# Patient Record
Sex: FEMALE | Race: WHITE | NOT HISPANIC OR LATINO | Employment: FULL TIME | URBAN - METROPOLITAN AREA
[De-identification: names, ages, dates, MRNs, and addresses within clinical notes are randomized per-mention and may not be internally consistent; named-entity substitution may affect disease eponyms.]

---

## 2018-12-21 ENCOUNTER — ANESTHESIA - HEALTHEAST (OUTPATIENT)
Dept: SURGERY | Facility: AMBULATORY SURGERY CENTER | Age: 48
End: 2018-12-21

## 2018-12-21 RX ORDER — IBUPROFEN 200 MG
200-800 TABLET ORAL EVERY 6 HOURS PRN
Status: SHIPPED | COMMUNITY
Start: 2018-12-21

## 2018-12-21 ASSESSMENT — MIFFLIN-ST. JEOR
SCORE: 1604.26
SCORE: 1604.26

## 2018-12-26 ENCOUNTER — SURGERY - HEALTHEAST (OUTPATIENT)
Dept: SURGERY | Facility: AMBULATORY SURGERY CENTER | Age: 48
End: 2018-12-26

## 2018-12-26 ENCOUNTER — HOSPITAL ENCOUNTER (OUTPATIENT)
Dept: SURGERY | Facility: AMBULATORY SURGERY CENTER | Age: 48
Discharge: HOME | End: 2018-12-26
Attending: OBSTETRICS & GYNECOLOGY | Admitting: OBSTETRICS & GYNECOLOGY

## 2018-12-26 DIAGNOSIS — N92.1 MENOMETRORRHAGIA: ICD-10-CM

## 2018-12-26 LAB
DIPSTICK EXPIRATION DATE - HISTORICAL: NORMAL
DIPSTICK LOT NUMBER - HISTORICAL: NORMAL
POC PREG URINE (HCG) HE - HISTORICAL: NEGATIVE
POC SPECIFIC GRAVITY, URINE - HISTORICAL: NORMAL
POCT KIT EXPIRATION DATE - HISTORICAL: NORMAL
POCT KIT LOT NUMBER HE - HISTORICAL: NORMAL
POCT NEGATIVE CONTROL HE - HISTORICAL: NORMAL
POCT POSITIVE CONTROL HE - HISTORICAL: NORMAL

## 2018-12-26 ASSESSMENT — MIFFLIN-ST. JEOR
SCORE: 1604.26
SCORE: 1604.26

## 2020-01-14 ENCOUNTER — OFFICE VISIT - HEALTHEAST (OUTPATIENT)
Dept: SURGERY | Facility: CLINIC | Age: 50
End: 2020-01-14

## 2020-01-14 DIAGNOSIS — K80.20 CALCULUS OF GALLBLADDER WITHOUT CHOLECYSTITIS WITHOUT OBSTRUCTION: ICD-10-CM

## 2020-01-14 ASSESSMENT — MIFFLIN-ST. JEOR: SCORE: 1615.59

## 2020-01-16 ENCOUNTER — ANESTHESIA - HEALTHEAST (OUTPATIENT)
Dept: SURGERY | Facility: AMBULATORY SURGERY CENTER | Age: 50
End: 2020-01-16

## 2020-01-16 ASSESSMENT — MIFFLIN-ST. JEOR: SCORE: 1615.59

## 2020-01-17 ENCOUNTER — SURGERY - HEALTHEAST (OUTPATIENT)
Dept: SURGERY | Facility: AMBULATORY SURGERY CENTER | Age: 50
End: 2020-01-17

## 2020-01-23 ENCOUNTER — OFFICE VISIT - HEALTHEAST (OUTPATIENT)
Dept: SURGERY | Facility: CLINIC | Age: 50
End: 2020-01-23

## 2020-01-23 DIAGNOSIS — Z48.89 POSTOPERATIVE VISIT: ICD-10-CM

## 2021-05-26 VITALS — TEMPERATURE: 98.1 F

## 2021-06-02 VITALS
BODY MASS INDEX: 36.49 KG/M2 | HEIGHT: 65 IN | HEIGHT: 65 IN | BODY MASS INDEX: 36.49 KG/M2 | WEIGHT: 219 LBS | WEIGHT: 219 LBS

## 2021-06-04 VITALS
BODY MASS INDEX: 35.03 KG/M2 | SYSTOLIC BLOOD PRESSURE: 110 MMHG | HEIGHT: 66 IN | OXYGEN SATURATION: 98 % | DIASTOLIC BLOOD PRESSURE: 78 MMHG | HEART RATE: 81 BPM | WEIGHT: 218 LBS

## 2021-06-04 VITALS — WEIGHT: 218 LBS | HEIGHT: 66 IN | BODY MASS INDEX: 35.03 KG/M2

## 2021-06-05 NOTE — PROGRESS NOTES
General Surgery Consultation    Consulting Provider: ED Provider, Dr. Salvador    CC: gallstones and RUQ pain    History:   Shania Block is a 49 y.o. female referred to see me for gallstones and RUQ pain. Pt presented to the ED on 1/11 with complaints of acute right upper quadrant pain and nausea. Her labs were normal and US showed a large calcified gallstone but no evidence of cholecystitis so patient was discharged and scheduled for followup today.    She states the pain has now resolved but she occasionally feels a dull ache in her right upper quadrant. She has had recurrent and intermittent RUQ pain 6-7 times over the last 6 months and it is becoming more frequent. She denies any fever, chills, vomiting.     Allergies:  Tramadol    Past medical history:  Past Medical History:   Diagnosis Date     Arthritis        Past surgical history:  Past Surgical History:   Procedure Laterality Date     VT HYSTEROSCOPY,W/ENDOMETRIAL ABLATION N/A 12/26/2018    Procedure: HYSTEROSCOPY, D&C, NOVASURE ENDOMETRIAL ABLATION;  Surgeon: Magda Cai MD;  Location: [facility];  Service: Gynecology     TUBAL LIGATION         Medications:    Current Outpatient Medications:      HYDROcodone-acetaminophen (NORCO )  mg per tablet, Take 1 tablet by mouth every 8 (eight) hours as needed for pain., Disp: 10 tablet, Rfl: 0     ibuprofen (ADVIL,MOTRIN) 200 MG tablet, Take 200 mg by mouth every 6 (six) hours as needed for pain., Disp: , Rfl:     Family history:  Pt denies family history of gallstones or gallbladder disease    Social history:   reports that she has quit smoking. She has never used smokeless tobacco. She reports current alcohol use. She reports that she does not use drugs.    Review of Systems:  General: No complaints or constitutional symptoms  Skin: no rashes  Hematologic/Lymphatic: No symptoms or complaints  Psychiatric: No symptoms or complaints  Endocrine: No excessive fatigue, no  "hypermetabolic symptoms reported  Respiratory: No cough, shortness of breath, or wheezing  Cardiovascular: No chest pain or dyspnea on exertion  Gastrointestinal: see HPI  Musculoskeletal: No recent injuries reported  Neurological: No focal neurologic defects reported  Breast: No discharge, skin changes, or palpable masses    Exam:  /78 (Patient Site: Right Arm, Patient Position: Sitting, Cuff Size: Adult Large)   Pulse 81   Ht 5' 6\" (1.676 m)   Wt 218 lb (98.9 kg)   SpO2 98%   Breastfeeding No   BMI 35.19 kg/m    Body mass index is 35.19 kg/m .  General : Alert, cooperative, appears stated age   Skin: Skin color, texture, turgor normal, no rashes or lesions   Lymphatic: No obvious adenopathy, no swelling   Eyes: No scleral icterus, pupils equal  HENT: No traumatic injury to the head or face, no gross abnormalities  Lungs: Normal respiratory effort, breath sounds equal bilaterally  Heart: Regular rate and rhythm  Abdomen: soft, nondistended, nontender to palpation throughout, no hernias  Musculoskeletal: No obvious swelling  Neurologic: Grossly intact    Labs:  Lab Results   Component Value Date    WBC 7.1 01/11/2020    HGB 13.8 01/11/2020    HCT 40.7 01/11/2020    MCV 86 01/11/2020     01/11/2020     Results from last 7 days   Lab Units 01/11/20  1433   LN-SODIUM mmol/L 139   LN-POTASSIUM mmol/L 3.9   LN-CHLORIDE mmol/L 105   LN-CO2 mmol/L 24   LN-BLOOD UREA NITROGEN mg/dL 8   LN-CREATININE mg/dL 0.71   LN-CALCIUM mg/dL 8.7     Lab Results   Component Value Date    ALT 76 (H) 01/11/2020    AST 80 (H) 01/11/2020    ALKPHOS 75 01/11/2020    BILITOT 0.5 01/11/2020       Imaging:   Pertinent images personally reviewed by myself and discussed with the patient.  Radiology reports:  US Abd-1.  Large calcified gallstone. Negative sonographic Darnell sign. No wall thickening.    Assessment/Plan:   Shania Block is a 49 y.o. female with signs and symptoms consistent with biliary colic.  I have " explained the pathophysiology of gallstones and gallbladder disease in detail as well as the surgical versus non-operative management strategies.      The risks of surgery were discussed in detail which include, but are not limited to, bleeding, infection, injury to surrounding structures including the common bile duct and hepatic vessels, the need to convert to an open procedure, blood clots, stroke, heart attack and death.  Additionally, the risks of non operative management were discussed which include, but are not limited to recurrent pain and infection of gallbladder or bile ducts.    She understands everything which was discussed and has consented to proceed with a laparoscopic cholecystectomy. Surgery scheduled for 1/17/20.    Luciana Whiting MD  General Surgery  [facility]  [phone]

## 2021-06-05 NOTE — ANESTHESIA CARE TRANSFER NOTE
Last vitals:   Vitals:    01/17/20 1106   BP: (!) 181/79   Pulse: 67   Resp: 14   Temp: 36.2  C (97.2  F)   SpO2: (P) 100%     Patient spontaneous RR, TV 300s, suctioned, following commands, extubated to facemask 10LPM, O2 sats 100%. VSS. Report to RN.    Patient's level of consciousness is drowsy  Spontaneous respirations: yes  Maintains airway independently: yes  Dentition unchanged: yes  Oropharynx: oropharynx clear of all foreign objects    QCDR Measures:  ASA# 20 - Surgical Safety Checklist: WHO surgical safety checklist completed prior to induction    PQRS# 430 - Adult PONV Prevention: 4558F - Pt received => 2 anti-emetic agents (different classes) preop & intraop  ASA# 8 - Peds PONV Prevention: NA - Not pediatric patient, not GA or 2 or more risk factors NOT present  PQRS# 424 - Sandra-op Temp Management: 4559F - At least one body temp DOCUMENTED => 35.5C or 95.9F within required timeframe  PQRS# 426 - PACU Transfer Protocol: - Transfer of care checklist used  ASA# 14 - Acute Post-op Pain: ASA14B - Patient did NOT experience pain >= 7 out of 10

## 2021-06-05 NOTE — PROGRESS NOTES
Gallbladder education & surgery packet provided to pt.    Chiqui Stiles CMA ([facility])

## 2021-06-05 NOTE — PROGRESS NOTES
"HPI: Pt is here for follow up laparoscopic cholecystectomy with Dr. Whiting on 1/17/2020.   she is doing well but is worried about her epigastric incision site. She reports some discharge a couple days ago that was \"yellow and smelly\". This was following her daily administration of hydrogen peroxide to the incisions. Pain is well controlled.  she is eating well and denies fever and chills.         Temp 98.1  F (36.7  C)     EXAM:  GENERAL:Appears well  ABDOMEN:  Soft, +BS  SURGICAL WOUNDS:  Incisions healing well but there 0.25 cm separation with slough at the epigastric site, no erythema, tenderness or induration; no expression of drainage with palpation/pressure      Assessment/Plan: No signs of infection at wound sites, but there is some minor separation of the incision and slough within the incision. This could be caused by tissue damage from the repeated use of hydrogen peroxide. She should stop using the hydrogen peroxide and clean incisions with soap and water. She was instructed about signs/symptoms of infection and was told to follow up as needed.    Elisha Shukla , [facility]       "

## 2021-06-05 NOTE — ANESTHESIA POSTPROCEDURE EVALUATION
Patient: Shania Block  CHOLECYSTECTOMY, LAPAROSCOPIC  Anesthesia type: general    Patient location: Phase II Recovery  Last vitals:   Vitals Value Taken Time   /75 1/17/2020  1:00 PM   Temp 36.2  C (97.2  F) 1/17/2020 11:06 AM   Pulse 66 1/17/2020  1:00 PM   Resp 16 1/17/2020  1:00 PM   SpO2 96 % 1/17/2020  1:00 PM     Post vital signs: stable  Level of consciousness: awake and responds to simple questions  Post-anesthesia pain: pain controlled  Post-anesthesia nausea and vomiting: no  Pulmonary: unassisted, return to baseline  Cardiovascular: stable and blood pressure at baseline  Hydration: adequate  Anesthetic events: no    QCDR Measures:  ASA# 11 - Sandra-op Cardiac Arrest: ASA11B - Patient did NOT experience unanticipated cardiac arrest  ASA# 12 - Sandra-op Mortality Rate: ASA12B - Patient did NOT die  ASA# 13 - PACU Re-Intubation Rate: ASA13B - Patient did NOT require a new airway mgmt  ASA# 10 - Composite Anes Safety: ASA10A - No serious adverse event    Additional Notes:

## 2021-06-05 NOTE — ANESTHESIA PREPROCEDURE EVALUATION
Anesthesia Evaluation      Patient summary reviewed   No history of anesthetic complications     Airway   Mallampati: II   Pulmonary - negative ROS and normal exam                          Cardiovascular - negative ROS and normal exam  Rhythm: regular  Rate: normal,         Neuro/Psych - negative ROS     Endo/Other - negative ROS      GI/Hepatic/Renal - negative ROS      Other findings: Results for EDDA SIMPSON (MRN [id number]) as of 1/17/2020 09:09    1/11/2020 14:33  Sodium: 139  Potassium: 3.9  Chloride: 105  CO2: 24  Anion Gap, Calculation: 10  BUN: 8  Creatinine: 0.71  GFR MDRD Af Amer: >60  GFR MDRD Non Af Amer: >60  Results for EDDA SIMPSON (MRN [id number]) as of 1/17/2020 09:09    1/11/2020 14:33  WBC: 7.1  RBC: 4.72  Hemoglobin: 13.8  Hematocrit: 40.7  MCV: 86  MCH: 29.2  MCHC: 33.9  RDW: 12.5  Platelets: 285        Dental - normal exam                        Anesthesia Plan  Planned anesthetic: general endotracheal  GAETT  Scopolamine for PONV  Antiemetics  Tylenol po pre op  Toradol at the end of case if okay with surgeon  Consider background propofol  Soft bite block  ASA 1   Induction: intravenous   Anesthetic plan and risks discussed with: patient    Post-op plan: routine recovery

## 2021-06-16 PROBLEM — K80.10 CHRONIC CHOLECYSTITIS WITH CALCULUS: Status: ACTIVE | Noted: 2020-01-14

## 2021-06-22 NOTE — ANESTHESIA CARE TRANSFER NOTE
Last vitals:   Vitals:    12/26/18 0850   BP: (P) 120/58   Pulse:    Resp: (P) 16   Temp: (P) 36.8  C (98.3  F)   SpO2: (P) 97%     Pt brought to phase 2 on 6L facemask. Monitors applied. VSS upon arrival.    Patient's level of consciousness is drowsy  Spontaneous respirations: yes  Maintains airway independently: yes  Dentition unchanged: yes  Oropharynx: oropharynx clear of all foreign objects    QCDR Measures:  ASA# 20 - Surgical Safety Checklist: WHO surgical safety checklist completed prior to induction    PQRS# 430 - Adult PONV Prevention: 4558F - Pt received => 2 anti-emetic agents (different classes) preop & intraop  ASA# 8 - Peds PONV Prevention: NA - Not pediatric patient, not GA or 2 or more risk factors NOT present  PQRS# 424 - Sandra-op Temp Management: 4559F - At least one body temp DOCUMENTED => 35.5C or 95.9F within required timeframe  PQRS# 426 - PACU Transfer Protocol: - Transfer of care checklist used  ASA# 14 - Acute Post-op Pain: ASA14B - Patient did NOT experience pain >= 7 out of 10

## 2021-06-22 NOTE — INTERVAL H&P NOTE
I have performed an assessment and examined the patient, as necessary, to update the patient's current status that may have changed since the prior History and Physical.  The History & Physical has been reviewed and the patient's status is unchanged.     Magda Cai MD FACOG  [facility]  [phone]

## 2021-06-22 NOTE — ANESTHESIA POSTPROCEDURE EVALUATION
Patient: Shania RODRIGUEZ Umair  HYSTEROSCOPY, D&C, NOVASURE ENDOMETRIAL ABLATION  Anesthesia type: MAC    Patient location: Phase II Recovery  Last vitals:   Vitals:    12/26/18 0850   BP: 120/58   Pulse:    Resp: 16   Temp: 36.8  C (98.3  F)   SpO2: 97%     Post vital signs: stable  Level of consciousness: awake and responds to simple questions  Post-anesthesia pain: pain controlled  Post-anesthesia nausea and vomiting: no  Pulmonary: unassisted, return to baseline  Cardiovascular: stable and blood pressure at baseline  Hydration: adequate  Anesthetic events: no    QCDR Measures:  ASA# 11 - Sandra-op Cardiac Arrest: ASA11B - Patient did NOT experience unanticipated cardiac arrest  ASA# 12 - Sandra-op Mortality Rate: ASA12B - Patient did NOT die  ASA# 13 - PACU Re-Intubation Rate: ASA13B - Patient did NOT require a new airway mgmt  ASA# 10 - Composite Anes Safety: ASA10A - No serious adverse event    Additional Notes:

## 2021-06-22 NOTE — ANESTHESIA PREPROCEDURE EVALUATION
Anesthesia Evaluation      Patient summary reviewed   No history of anesthetic complications     Airway   Mallampati: II  Neck ROM: full   Pulmonary - normal exam   (+) a smoker (remote)                         Cardiovascular - negative ROS and normal exam   Neuro/Psych - negative ROS     Endo/Other    (+) obesity (bmi 36),      GI/Hepatic/Renal - negative ROS      Other findings: H/o Lyme disease      Dental - normal exam   (+) caps                       Anesthesia Plan  Planned anesthetic: MAC    ASA 2   Induction: intravenous   Anesthetic plan and risks discussed with: patient and significant other  Anesthesia plan special considerations: antiemetics,   Post-op plan: routine recovery      UPT negative

## 2021-06-22 NOTE — OP NOTE
PROCEDURE NOTE - HYSTEROSCOPY AND DILATION AND CURETTAGE, NOVASURE ABLATION    Name: Shania Block   : 1970   MRN: [id number]     DATE OF SERVICE:  2018     PREOPERATIVE DIAGNOSIS: thickened endometrium and menorrhagi    POSTOPERATIVE DIAGNOSIS: same  PROCEDURES: Hysteroscopy, dilatation and curettage, novasure    SURGEON: Magda Cai     ANESTHESIA:  mac    ESTIMATED BLOOD LOSS:   5 mL    HISTORY OF PRESENT ILLNESS:  This is a 48 y.o. female with history of worsening menorrhagia. She had a transvaginal ultrasound prior to which showed a thickened endometrium but no discrete pathology. Endometrial biopsy was benign tissue.  She desired minimally invasive treatment.  She was given informed consent for the above procedure. The risks, benefits and alternatives were discussed with her including but not exclusive to uterine perforation, bleeding and infection, failure and need for further procedures. She expressed understanding and wished to proceed.     PROCEDURE:  The patient was brought to the operating room and after induction of MAC anesthesia was prepped and draped in the dorsal lithotomy position. A time out was called and the patient and the procedure were verified.      A sterile weighted speculum was then placed. The anterior lip of the cervix was grasped with a single tooth tenaculum. Uterine cavity was then sounded to 7 cm. The cervix was gently dilated using Margaret dilators and the hysteroscope was introduced without difficulty. The cavity of the uterus appeared to have marked proliferative tissue. The hysteroscope was removed. At this point endometrial curettings were obtained by curettage. All quadrants were sampled, and the tissue was submitted for pathologic exam.  Novasure was performed with a cavity length of 4.0 cm, width of 3.7 cm and cycle time of 1 minute 48 seconds. The hysteroscope was reinserted and the uterine cavity was visualized again.  At this point ablated  tissue was confirmed. Instruments were removed from vagina. No active bleeding was noted. Sponge and needle counts were correct X 2. She was taken to recovery in stable condition.    Magda Cai MD

## 2022-06-24 ENCOUNTER — APPOINTMENT (OUTPATIENT)
Dept: CT IMAGING | Facility: HOSPITAL | Age: 52
End: 2022-06-24
Attending: EMERGENCY MEDICINE
Payer: COMMERCIAL

## 2022-06-24 ENCOUNTER — HOSPITAL ENCOUNTER (EMERGENCY)
Facility: HOSPITAL | Age: 52
Discharge: ANOTHER HOSPITAL | End: 2022-06-24
Attending: EMERGENCY MEDICINE | Admitting: EMERGENCY MEDICINE
Payer: COMMERCIAL

## 2022-06-24 VITALS
HEIGHT: 66 IN | DIASTOLIC BLOOD PRESSURE: 62 MMHG | SYSTOLIC BLOOD PRESSURE: 134 MMHG | TEMPERATURE: 96 F | HEART RATE: 110 BPM | RESPIRATION RATE: 16 BRPM | BODY MASS INDEX: 33.75 KG/M2 | OXYGEN SATURATION: 94 % | WEIGHT: 210 LBS

## 2022-06-24 DIAGNOSIS — R10.84 ABDOMINAL PAIN, GENERALIZED: ICD-10-CM

## 2022-06-24 LAB
ALBUMIN SERPL-MCNC: 3.7 G/DL (ref 3.5–5)
ALBUMIN UR-MCNC: 20 MG/DL
ALP SERPL-CCNC: 102 U/L (ref 45–120)
ALT SERPL W P-5'-P-CCNC: 265 U/L (ref 0–45)
ANION GAP SERPL CALCULATED.3IONS-SCNC: 13 MMOL/L (ref 5–18)
APPEARANCE UR: CLEAR
AST SERPL W P-5'-P-CCNC: 216 U/L (ref 0–40)
BACTERIA #/AREA URNS HPF: ABNORMAL /HPF
BASOPHILS # BLD AUTO: 0.1 10E3/UL (ref 0–0.2)
BASOPHILS NFR BLD AUTO: 1 %
BILIRUB SERPL-MCNC: 1.2 MG/DL (ref 0–1)
BILIRUB UR QL STRIP: NEGATIVE
BUN SERPL-MCNC: 15 MG/DL (ref 8–22)
CALCIUM SERPL-MCNC: 9 MG/DL (ref 8.5–10.5)
CHLORIDE BLD-SCNC: 103 MMOL/L (ref 98–107)
CO2 SERPL-SCNC: 21 MMOL/L (ref 22–31)
COLOR UR AUTO: YELLOW
CREAT SERPL-MCNC: 1 MG/DL (ref 0.6–1.1)
EOSINOPHIL # BLD AUTO: 0 10E3/UL (ref 0–0.7)
EOSINOPHIL NFR BLD AUTO: 0 %
ERYTHROCYTE [DISTWIDTH] IN BLOOD BY AUTOMATED COUNT: 12.8 % (ref 10–15)
GFR SERPL CREATININE-BSD FRML MDRD: 67 ML/MIN/1.73M2
GLUCOSE BLD-MCNC: 160 MG/DL (ref 70–125)
GLUCOSE UR STRIP-MCNC: NEGATIVE MG/DL
HCT VFR BLD AUTO: 53 % (ref 35–47)
HGB BLD-MCNC: 17.6 G/DL (ref 11.7–15.7)
HGB UR QL STRIP: NEGATIVE
IMM GRANULOCYTES # BLD: 0.1 10E3/UL
IMM GRANULOCYTES NFR BLD: 0 %
KETONES UR STRIP-MCNC: ABNORMAL MG/DL
LEUKOCYTE ESTERASE UR QL STRIP: NEGATIVE
LIPASE SERPL-CCNC: 41 U/L (ref 0–52)
LYMPHOCYTES # BLD AUTO: 0.6 10E3/UL (ref 0.8–5.3)
LYMPHOCYTES NFR BLD AUTO: 4 %
MCH RBC QN AUTO: 29 PG (ref 26.5–33)
MCHC RBC AUTO-ENTMCNC: 33.2 G/DL (ref 31.5–36.5)
MCV RBC AUTO: 87 FL (ref 78–100)
MONOCYTES # BLD AUTO: 0.9 10E3/UL (ref 0–1.3)
MONOCYTES NFR BLD AUTO: 6 %
MUCOUS THREADS #/AREA URNS LPF: PRESENT /LPF
NEUTROPHILS # BLD AUTO: 13.7 10E3/UL (ref 1.6–8.3)
NEUTROPHILS NFR BLD AUTO: 89 %
NITRATE UR QL: NEGATIVE
NRBC # BLD AUTO: 0 10E3/UL
NRBC BLD AUTO-RTO: 0 /100
PH UR STRIP: 5.5 [PH] (ref 5–7)
PLATELET # BLD AUTO: 465 10E3/UL (ref 150–450)
POTASSIUM BLD-SCNC: 4.2 MMOL/L (ref 3.5–5)
PROT SERPL-MCNC: 7.9 G/DL (ref 6–8)
RBC # BLD AUTO: 6.07 10E6/UL (ref 3.8–5.2)
RBC URINE: 1 /HPF
SARS-COV-2 RNA RESP QL NAA+PROBE: NEGATIVE
SODIUM SERPL-SCNC: 137 MMOL/L (ref 136–145)
SP GR UR STRIP: >1.05 (ref 1–1.03)
SQUAMOUS EPITHELIAL: 2 /HPF
UROBILINOGEN UR STRIP-MCNC: <2 MG/DL
WBC # BLD AUTO: 15.3 10E3/UL (ref 4–11)
WBC URINE: 2 /HPF

## 2022-06-24 PROCEDURE — C9803 HOPD COVID-19 SPEC COLLECT: HCPCS

## 2022-06-24 PROCEDURE — 85004 AUTOMATED DIFF WBC COUNT: CPT | Performed by: EMERGENCY MEDICINE

## 2022-06-24 PROCEDURE — 96374 THER/PROPH/DIAG INJ IV PUSH: CPT | Mod: 59

## 2022-06-24 PROCEDURE — 36415 COLL VENOUS BLD VENIPUNCTURE: CPT | Performed by: EMERGENCY MEDICINE

## 2022-06-24 PROCEDURE — 81001 URINALYSIS AUTO W/SCOPE: CPT | Performed by: EMERGENCY MEDICINE

## 2022-06-24 PROCEDURE — 96375 TX/PRO/DX INJ NEW DRUG ADDON: CPT

## 2022-06-24 PROCEDURE — 250N000011 HC RX IP 250 OP 636: Performed by: EMERGENCY MEDICINE

## 2022-06-24 PROCEDURE — 96361 HYDRATE IV INFUSION ADD-ON: CPT

## 2022-06-24 PROCEDURE — 96376 TX/PRO/DX INJ SAME DRUG ADON: CPT

## 2022-06-24 PROCEDURE — 80053 COMPREHEN METABOLIC PANEL: CPT | Performed by: EMERGENCY MEDICINE

## 2022-06-24 PROCEDURE — 87635 SARS-COV-2 COVID-19 AMP PRB: CPT | Performed by: EMERGENCY MEDICINE

## 2022-06-24 PROCEDURE — 258N000003 HC RX IP 258 OP 636: Performed by: EMERGENCY MEDICINE

## 2022-06-24 PROCEDURE — 83690 ASSAY OF LIPASE: CPT | Performed by: EMERGENCY MEDICINE

## 2022-06-24 PROCEDURE — 99285 EMERGENCY DEPT VISIT HI MDM: CPT | Mod: 25

## 2022-06-24 PROCEDURE — 74177 CT ABD & PELVIS W/CONTRAST: CPT

## 2022-06-24 RX ORDER — ONDANSETRON 2 MG/ML
4 INJECTION INTRAMUSCULAR; INTRAVENOUS ONCE
Status: COMPLETED | OUTPATIENT
Start: 2022-06-24 | End: 2022-06-24

## 2022-06-24 RX ORDER — SODIUM CHLORIDE 9 MG/ML
INJECTION, SOLUTION INTRAVENOUS CONTINUOUS
Status: DISCONTINUED | OUTPATIENT
Start: 2022-06-24 | End: 2022-06-24 | Stop reason: HOSPADM

## 2022-06-24 RX ORDER — MORPHINE SULFATE 4 MG/ML
4 INJECTION, SOLUTION INTRAMUSCULAR; INTRAVENOUS ONCE
Status: COMPLETED | OUTPATIENT
Start: 2022-06-24 | End: 2022-06-24

## 2022-06-24 RX ORDER — IOPAMIDOL 755 MG/ML
75 INJECTION, SOLUTION INTRAVASCULAR ONCE
Status: COMPLETED | OUTPATIENT
Start: 2022-06-24 | End: 2022-06-24

## 2022-06-24 RX ADMIN — MORPHINE SULFATE 4 MG: 4 INJECTION INTRAVENOUS at 06:00

## 2022-06-24 RX ADMIN — MORPHINE SULFATE 4 MG: 4 INJECTION INTRAVENOUS at 07:46

## 2022-06-24 RX ADMIN — IOPAMIDOL 75 ML: 755 INJECTION, SOLUTION INTRAVENOUS at 08:58

## 2022-06-24 RX ADMIN — SODIUM CHLORIDE 1000 ML: 9 INJECTION, SOLUTION INTRAVENOUS at 05:59

## 2022-06-24 RX ADMIN — HYDROMORPHONE HYDROCHLORIDE 0.5 MG: 1 INJECTION, SOLUTION INTRAMUSCULAR; INTRAVENOUS; SUBCUTANEOUS at 13:41

## 2022-06-24 RX ADMIN — ONDANSETRON 4 MG: 2 INJECTION INTRAMUSCULAR; INTRAVENOUS at 07:47

## 2022-06-24 RX ADMIN — ONDANSETRON 4 MG: 2 INJECTION INTRAMUSCULAR; INTRAVENOUS at 13:50

## 2022-06-24 RX ADMIN — SODIUM CHLORIDE: 9 INJECTION, SOLUTION INTRAVENOUS at 13:41

## 2022-06-24 RX ADMIN — ONDANSETRON 4 MG: 2 INJECTION INTRAMUSCULAR; INTRAVENOUS at 05:59

## 2022-06-24 NOTE — ED NOTES
Called phlebotomist for hemolyzed green tube due to pt being hard stick and IV unable to draw blood off.

## 2022-06-24 NOTE — PHARMACY-ADMISSION MEDICATION HISTORY
Pharmacy Note - Admission Medication History    Pertinent Provider Information: may use upto 1200mg ibuprofen per dose, I did talk to her about a 800mg dose max.     ______________________________________________________________________    Prior To Admission (PTA) med list completed and updated in EMR.       PTA Med List   Medication Sig Note Last Dose     ibuprofen (ADVIL,MOTRIN) 200 MG tablet Take 200-800 mg by mouth every 6 hours as needed May also use advil arthritis- 400mg tab 6/24/2022: Does take upto 6 tabs(1200mg) per dose 6/22/2022       Information source(s): Patient, Clinic records and [facility]  Method of interview communication: in-person    Summary of Changes to PTA Med List  New: none  Discontinued: none  Changed: none    Patient was asked about OTC/herbal products specifically.  PTA med list reflects this.    In the past week, patient estimated taking medication this percent of the time:  greater than 90%.    Allergies were reviewed, assessed, and updated with the patient.      Patient does not use any multi-dose medications prior to admission.    The information provided in this note is only as accurate as the sources available at the time of the update(s).    Thank you for the opportunity to participate in the care of this patient.    Marcelo Alonzo RPH  6/24/2022 8:20 AM

## 2022-06-24 NOTE — ED PROVIDER NOTES
EMERGENCY DEPARTMENT ENCOUNTER      NAME: Shania Block  AGE: 52 year old female  YOB: 1970  MRN: [id number]  EVALUATION DATE & TIME: No admission date for patient encounter.    PCP: No primary care provider on file.    ED PROVIDER: Rula Beltrán M.D.      Chief Complaint   Patient presents with     Abdominal Pain         FINAL IMPRESSION:  1. Abdominal pain, generalized          ED COURSE & MEDICAL DECISION MAKING:    ED Course as of 06/24/22 0552   Fri Jun 24, 2022   0548 Patient with abdominal pain and distension with no flatus and with vomiting with history of remote cholecystectomy with high suspicion of SBO, given morphine/zofran and IVF and CT abdomen pending, patient and  amenable to plan in the ED.   0552 Patient signed out to daytime ED MD pending CT abdomen and clinical reassessment       5:43 AM I met with the patient, obtained history, performed an initial exam, and discussed options and plan for diagnostics and treatment here in the ED.    Pertinent Labs & Imaging studies reviewed. (See chart for details)    N95 worn  A face shield was worn also  COVID PPE      At the conclusion of the encounter I discussed the results of all of the tests and the disposition. The questions were answered. The patient or family acknowledged understanding and was agreeable with the care plan.     MEDICATIONS GIVEN IN THE EMERGENCY:  Medications   0.9% sodium chloride BOLUS (has no administration in time range)   morphine (PF) injection 4 mg (has no administration in time range)   ondansetron (ZOFRAN) injection 4 mg (has no administration in time range)       NEW PRESCRIPTIONS STARTED AT TODAY'S ER VISIT  New Prescriptions    No medications on file          =================================================================    HPI    Shania Block is a 52 year old female with PMHx of hyperlipidemia, s/p cholecystectomy, Lyme disease with arthritis, obesity, who presents to the ED today via  walk-in with abdominal pain and vomiting.     Patient reports constant epigastric pain that started yesterday at 1900. She states she initially thought that pain would resolve, but it progressively worsened. She describes pain as sharp, cramping, and stabbing. Patient notes that around 2200 last night, she started vomiting and has had multiple episodes throughout the night. At present, pain is greater than 10/10. She notes associating difficulties passing gas. Patient reports a previous cholecystectomy. Otherwise, she denies any fever, cough, and urinary symptoms including dysuria, hematuria, urgency, and frequency. She denies any history of bowel obstruction. Patient is not on blood thinners. No other complaints at this time.    REVIEW OF SYSTEMS   All other systems reviewed and are negative except as noted above in HPI.    PAST MEDICAL HISTORY:  Past Medical History:   Diagnosis Date     Arthritis        PAST SURGICAL HISTORY:  Past Surgical History:   Procedure Laterality Date     HYSTEROSCOPY, ABLATE ENDOMETRIUM HYDROTHERMAL, COMBINED N/A 12/26/2018    Procedure: HYSTEROSCOPY, D&C, NOVASURE ENDOMETRIAL ABLATION;  Surgeon: Magda Cai MD;  Location: [facility];  Service: Gynecology     TUBAL LIGATION       ZZC LAP,CHOLECYSTECTOMY/EXPLORE N/A 1/17/2020    Procedure: CHOLECYSTECTOMY, LAPAROSCOPIC;  Surgeon: Luciana Whiting MD;  Location: [facility];  Service: General       CURRENT MEDICATIONS:    HYDROcodone-acetaminophen 5-325 mg per tablet  ibuprofen (ADVIL,MOTRIN) 200 MG tablet        ALLERGIES:  Allergies   Allergen Reactions     Tramadol Itching       FAMILY HISTORY:  History reviewed. No pertinent family history.    SOCIAL HISTORY:   Social History     Socioeconomic History     Marital status:    Tobacco Use     Smoking status: Former Smoker     Smokeless tobacco: Never Used   Substance and Sexual Activity     Alcohol use: Yes     Drug use: No       VITALS:  Patient  "Vitals for the past 24 hrs:   BP Temp Temp src Pulse Resp SpO2 Height Weight   06/24/22 0584 117/89 (!) 96  F (35.6  C) Tympanic 105 16 96 % 1.676 m (5' 6\") 95.3 kg (210 lb)       PHYSICAL EXAM    GENERAL: Awake, alert.   HEENT: Normocephalic, atraumatic.  Pupils equal, round and reactive.  Conjunctiva normal.  EOMI.  NECK: No stridor or apparent deformity.  PULMONARY: Symmetrical breath sounds without distress.  Lungs clear to auscultation bilaterally without wheezes, rhonchi or rales.  CARDIO: Regular rate and rhythm.  No significant murmur, rub or gallop.  Radial pulses strong and symmetrical.  ABDOMINAL: Abdomen soft, distended without any focal tenderness.  No CVAT, no palpable hepatosplenomegaly.  EXTREMITIES: No lower extremity swelling or edema.    NEURO: Alert and oriented to person, place and time.  Cranial nerves grossly intact.  No focal motor deficit.  PSYCH: Normal mood and affect  SKIN: No rashes             RADIOLOGY:  Reviewed all pertinent imaging. Please see official radiology report.  CT Abdomen Pelvis w Contrast    (Results Pending)           I, Laura Red, am serving as a scribe to document services personally performed by Dr. Rula Beltrán based on my observation and the provider's statements to me. IRula MD attest that Laura Red is acting in a scribe capacity, has observed my performance of the services and has documented them in accordance with my direction.     Rula Beltrán MD  06/24/22 0552    "

## 2022-06-24 NOTE — ED NOTES
EMERGENCY DEPARTMENT SIGN OUT NOTE        ED COURSE AND MEDICAL DECISION MAKING  Patient was signed out to me by Dr Rula Beltrán at 6:05 AM      In brief, Shania Block is a 52 year old female who initially presented acute abdominal pain with nausea, vomiting, but inability to pass gas or stool. She has history of cholecystectomy remotely.     At time of sign out, disposition was pending labs and CT abdomen.    6:33 AM.  Patient with moderate leukocytosis and polycythemia.  Polycythemia new compared to 2020.  6:55 AM.  Lipase normal at 41.  COVID-negative.  8:03 AM I introduced myself to the patient and discussed plans   9:10 AM.  Comprehensive metabolic panel remarkable other than elevated transaminases.  In isolation likely reflective of fatty liver disease.  Awaiting CT imaging.  9:50 AM.  CT imaging with small bowel obstruction.  Transition point in right lower quadrant.  No clear etiology.  Patient informed of findings.  Plan for hospitalization.  Call placed to admitting physician and surgery.  10:17 AM I talked with patient who is agreeable with plan for transfer.   12:41 PM I talked with Dr. Valdes, hospitalist at [facility], who accepts patient for transfer.  However, [facility] bed control reports bed not available for at least 4 hours.  Patient is not to transfer until call was received from the unit.  1 PM.  [facility] called to report bed may not be available for 48 hours.  Patient informed admission is still in a holding pattern as she may yet obtain a bed here.  Additional intravenous fluids initiated.  Patient given Dilaudid 0.5 mg for additional pain relief.  2:30 PM.  [facility] members agree to transfer to [facility] after initial reservations.      FINAL IMPRESSION    1. Abdominal pain, generalized    2.  Small bowel obstruction    ED MEDS  Medications   0.9% sodium chloride BOLUS (0 mLs Intravenous Stopped 6/24/22 0909)   morphine (PF) injection 4 mg (4 mg Intravenous Given 6/24/22 0600)   ondansetron (ZOFRAN)  injection 4 mg (4 mg Intravenous Given 6/24/22 0559)   morphine (PF) injection 4 mg (4 mg Intravenous Given 6/24/22 6359)   ondansetron (ZOFRAN) injection 4 mg (4 mg Intravenous Given 6/24/22 5250)   iopamidol (ISOVUE-370) solution 75 mL (75 mLs Intravenous Given 6/24/22 8220)       LAB  Labs Ordered and Resulted from Time of ED Arrival to Time of ED Departure   COMPREHENSIVE METABOLIC PANEL - Abnormal       Result Value    Sodium 137      Potassium 4.2      Chloride 103      Carbon Dioxide (CO2) 21 (*)     Anion Gap 13      Urea Nitrogen 15      Creatinine 1.00      Calcium 9.0      Glucose 160 (*)     Alkaline Phosphatase 102       (*)      (*)     Protein Total 7.9      Albumin 3.7      Bilirubin Total 1.2 (*)     GFR Estimate 67     CBC WITH PLATELETS AND DIFFERENTIAL - Abnormal    WBC Count 15.3 (*)     RBC Count 6.07 (*)     Hemoglobin 17.6 (*)     Hematocrit 53.0 (*)     MCV 87      MCH 29.0      MCHC 33.2      RDW 12.8      Platelet Count 465 (*)     % Neutrophils 89      % Lymphocytes 4      % Monocytes 6      % Eosinophils 0      % Basophils 1      % Immature Granulocytes 0      NRBCs per 100 WBC 0      Absolute Neutrophils 13.7 (*)     Absolute Lymphocytes 0.6 (*)     Absolute Monocytes 0.9      Absolute Eosinophils 0.0      Absolute Basophils 0.1      Absolute Immature Granulocytes 0.1      Absolute NRBCs 0.0     LIPASE - Normal    Lipase 41     COVID-19 VIRUS (CORONAVIRUS) BY PCR - Normal    SARS CoV2 PCR Negative     ROUTINE UA WITH MICROSCOPIC REFLEX TO CULTURE         RADIOLOGY    CT Abdomen Pelvis w Contrast    Result Date: 6/24/2022  EXAM: CT ABDOMEN PELVIS W CONTRAST LOCATION: [facility] DATE/TIME: 6/24/2022 8:51 AM INDICATION: likely SBO, severe midabdominal pain with nausea vomiting, no flatus, h o cholecystectomy 2020 COMPARISON: None. TECHNIQUE: CT scan of the abdomen and pelvis was performed following injection of IV contrast. Multiplanar reformats were  obtained. Dose reduction techniques were used. CONTRAST: isovue 370 75ml FINDINGS: LOWER CHEST: Passive atelectasis. HEPATOBILIARY: Diffuse hepatic steatosis. Cholecystectomy. PANCREAS: Normal. SPLEEN: Normal. ADRENAL GLANDS: Normal. KIDNEYS/BLADDER: Normal. BOWEL: Distal small bowel obstruction with transition point in the right lower quadrant. Proximal to this there is fecalization of bowel with fluid distention. Precise etiology of obstruction not identified. There is no small bowel mass. It is possible this represents chronic inflammatory disease with stricture. Trace free fluid in the mesentery. LYMPH NODES: No adenopathy. Mesenteric vessels appear patent. VASCULATURE: The cava is flat suggesting hypovolemia. PELVIC ORGANS: No mass seen. There is fluid in the uterine cavity measures 6 mm. MUSCULOSKELETAL: Normal-appearing SI joints. Small pelvic bone sclerotic lesions are likely benign bone islands. Lower lumbar facet arthropathy.     IMPRESSION: 1.  Distal small bowel obstruction with transition point in the right lower quadrant. This could be an inflammatory or ischemic stricture, or adhesion. 2.  Trace fluid in the mesentery. 3.  Probable hypovolemia. 4.  Small amount uterine cavity fluid. This is appropriate if the patient is premenopausal. If not, ultrasound recommended. 5.  Diffuse hepatic steatosis.    DISCHARGE MEDS  New Prescriptions    No medications on file       Brenden Berry MD  [facility] EMERGENCY DEPARTMENT  [address] [id number] [phone]     Brenden Berry MD  06/25/22 6262

## 2022-11-02 ENCOUNTER — TRANSFERRED RECORDS (OUTPATIENT)
Dept: HEALTH INFORMATION MANAGEMENT | Facility: CLINIC | Age: 52
End: 2022-11-02